# Patient Record
Sex: MALE | Race: BLACK OR AFRICAN AMERICAN
[De-identification: names, ages, dates, MRNs, and addresses within clinical notes are randomized per-mention and may not be internally consistent; named-entity substitution may affect disease eponyms.]

---

## 2019-12-13 ENCOUNTER — HOSPITAL ENCOUNTER (EMERGENCY)
Dept: HOSPITAL 12 - ER | Age: 29
Discharge: HOME | End: 2019-12-13
Payer: SELF-PAY

## 2019-12-13 VITALS — HEIGHT: 76 IN | WEIGHT: 270 LBS | BODY MASS INDEX: 32.88 KG/M2

## 2019-12-13 VITALS — DIASTOLIC BLOOD PRESSURE: 66 MMHG | SYSTOLIC BLOOD PRESSURE: 125 MMHG
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
## 2019-12-13 DIAGNOSIS — S02.19XA: Primary | ICD-10-CM

## 2019-12-13 DIAGNOSIS — S05.12XA: ICD-10-CM
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
## 2019-12-13 PROCEDURE — A4663 DIALYSIS BLOOD PRESSURE CUFF: HCPCS
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
## 2019-12-13 NOTE — NUR
Patient discharged to home in stable conditon.  Written and verbal after care 
instructions given. 

Patient verbalizes understanding of instructions.

ALL BELONGINGS W/ PT

AMBULATORY